# Patient Record
Sex: MALE | Race: BLACK OR AFRICAN AMERICAN | NOT HISPANIC OR LATINO | Employment: OTHER | URBAN - METROPOLITAN AREA
[De-identification: names, ages, dates, MRNs, and addresses within clinical notes are randomized per-mention and may not be internally consistent; named-entity substitution may affect disease eponyms.]

---

## 2017-04-20 ENCOUNTER — HOSPITAL ENCOUNTER (EMERGENCY)
Facility: OTHER | Age: 27
Discharge: HOME | End: 2017-04-20
Attending: EMERGENCY MEDICINE

## 2017-04-20 VITALS
TEMPERATURE: 99 F | OXYGEN SATURATION: 100 % | SYSTOLIC BLOOD PRESSURE: 131 MMHG | HEART RATE: 60 BPM | DIASTOLIC BLOOD PRESSURE: 73 MMHG | WEIGHT: 150 LBS | RESPIRATION RATE: 16 BRPM | BODY MASS INDEX: 23.54 KG/M2 | HEIGHT: 67 IN

## 2017-04-20 DIAGNOSIS — M25.531 RIGHT WRIST PAIN: ICD-10-CM

## 2017-04-20 DIAGNOSIS — M25.539 WRIST PAIN: ICD-10-CM

## 2017-04-20 DIAGNOSIS — M25.331 WRIST JOINT INSTABILITY, RIGHT: Primary | ICD-10-CM

## 2017-04-20 PROCEDURE — 29125 APPL SHORT ARM SPLINT STATIC: CPT | Mod: RT

## 2017-04-20 PROCEDURE — 99283 EMERGENCY DEPT VISIT LOW MDM: CPT | Mod: 25

## 2017-04-20 RX ORDER — IBUPROFEN 800 MG/1
800 TABLET ORAL EVERY 6 HOURS PRN
Qty: 20 TABLET | Refills: 0 | Status: SHIPPED | OUTPATIENT
Start: 2017-04-20 | End: 2018-01-18

## 2017-04-20 RX ORDER — HYDROCODONE BITARTRATE AND ACETAMINOPHEN 5; 325 MG/1; MG/1
1 TABLET ORAL EVERY 4 HOURS PRN
Qty: 10 TABLET | Refills: 0 | Status: SHIPPED | OUTPATIENT
Start: 2017-04-20 | End: 2017-04-30

## 2017-04-20 NOTE — ED PROVIDER NOTES
"Encounter Date: 4/20/2017    SCRIBE #1 NOTE: I, Shad Marieeinz, am scribing for, and in the presence of,  Dr. Smith. I have scribed the entire note.       History     Chief Complaint   Patient presents with    Wrist Pain     + rigth wrist pain, "i broke this wrsit a few years ago and had a cast. Now it just keeps locking up on my and goes numb sometimes". Pt denies trauma to site. +2 pulses to affected extremity.     Review of patient's allergies indicates:  No Known Allergies  HPI Comments: Time seen by provider: 12:32 PM    This is a 26 y.o. male who presents with complaint of right wrist problem. He reports chronic pain to the wrist since he broke it a few years ago. Over the past couple of days though, he has noticed that the wrist "locks up." He notes accompanying numbness in his hand and fingers. He rates the pain an 8/10 in severity. He has taken ibuprofen without relief. He is right hand dominant. He denies weakness, fever, chills and other joint pain. He smokes 0.25 packs of cigarettes per day but denies drinking and drug use.    The history is provided by the patient and a significant other.     History reviewed. No pertinent past medical history.  Past Surgical History:   Procedure Laterality Date    NO PAST SURGERIES       History reviewed. No pertinent family history.  Social History   Substance Use Topics    Smoking status: Current Every Day Smoker     Packs/day: 0.02     Types: Cigarettes    Smokeless tobacco: None    Alcohol use No     Review of Systems   Constitutional: Negative for chills, diaphoresis and fever.   HENT: Negative for congestion and sore throat.    Eyes: Negative for redness.   Respiratory: Negative for cough, chest tightness, shortness of breath and wheezing.    Cardiovascular: Negative for chest pain, palpitations and leg swelling.   Gastrointestinal: Negative for abdominal pain, diarrhea, nausea and vomiting.   Genitourinary: Negative for difficulty urinating and dysuria. "   Musculoskeletal: Negative for back pain and neck pain.        Right wrist pain   Skin: Negative for color change and rash.   Neurological: Positive for numbness. Negative for headaches.   Psychiatric/Behavioral: Negative for behavioral problems and confusion.       Physical Exam   Initial Vitals   BP Pulse Resp Temp SpO2   04/20/17 1114 04/20/17 1114 04/20/17 1114 04/20/17 1114 04/20/17 1114   143/90 90 16 97.8 °F (36.6 °C) 98 %     Physical Exam    Nursing note and vitals reviewed.  Constitutional: He appears well-developed and well-nourished. He is not diaphoretic. No distress.   HENT:   Head: Normocephalic and atraumatic.   Eyes: Right eye exhibits no discharge. Left eye exhibits no discharge.   Neck: Normal range of motion. Neck supple.   Cardiovascular: Normal rate, regular rhythm, normal heart sounds and intact distal pulses. Exam reveals no gallop and no friction rub.    No murmur heard.  2+ radial pulse of RUE.    Pulmonary/Chest: Breath sounds normal. No respiratory distress. He has no wheezes. He has no rhonchi. He has no rales.   Musculoskeletal: He exhibits tenderness.   Right wrist TTP over the distal ulna. Inability to supinate or pronate. Fingers held in slight passive flexion.    Neurological: He is alert and oriented to person, place, and time. No sensory deficit.   General weakness of right hand. Distal sensation is intact.    Skin: Skin is warm. No rash and no abscess noted. No erythema. No pallor.   RUE w/o erythema or skin changes.    Psychiatric: He has a normal mood and affect. His behavior is normal. Judgment and thought content normal.         ED Course   Splint Application  Date/Time: 4/20/2017 1:39 PM  Performed by: LANCE CARVAJAL  Authorized by: LANCE CARVAJAL   Location details: right wrist  Splint type: sugar tong  Post-procedure: The splinted body part was neurovascularly unchanged following the procedure.  Patient tolerance: Patient tolerated the procedure well with no immediate  complications        Labs Reviewed - No data to display       X-Rays:   Independently Interpreted Readings:   Other Readings:  X-Ray Wrist Complete Right - Abnormal superior projection of ulna seen on lateral view. No obvious fracture present.     Imaging Results         X-Ray Wrist Complete Right (Final result) Result time:  04/20/17 11:49:51    Final result by Berlin Barr MD (04/20/17 11:49:51)    Impression:     Findings concerning for radial ulnar dislocation.  Clinical correlation is recommended for this finding.  A punctate possible avulsion fracture is noted in the ulnar carpal joint space.      Electronically signed by: BERLIN BARR MD  Date:     04/20/17  Time:    11:49     Narrative:    Comparison: 3/8/2012.    Findings: PA, lateral and oblique radiographs of the right wrist were obtained.  The PA radiograph this appeared to show dislocation of the radial ulnar joint with distal displacement of the ulna.  There is soft tissue swelling present and there is a tiny, punctate radiopaque focus at the ulnar collateral pleural joint which may represent an avulsion fracture.  The remaining osseous structures, soft tissues and joint spaces are normal.             Medical Decision Making:   Clinical Tests:   Radiological Study: Ordered and Reviewed  ED Management:  Emergent evaluation a 26-year-old male with complaint of wrist pain, nontraumatic.  He does have a history of previous wrist fracture and chronic pain.  On exam, there is general weakness of the hand and tenderness over the distal ulna.  X-ray shows some dislocation, which after discussion with orthopedics may be chronic.  Orthopedic recommend sugar tong splint and follow-up with orthopedics.  He was discharged in good condition with ibuprofen, encouraged close follow-up or return for worsening.  Other:   I have discussed this case with another health care provider.       <> Summary of the Discussion: 12:42 PM - I paged orthopedics.  1:38 PM -  I discussed the case with Orthopedics who states the X-Ray findings are most likely chronic based on Hx of previous injury. He suggested a sugar tong splint and follow up with hand specialist.     Additional MDM:   X-Rays: I have independently interpreted X-Ray(s) - see notes.          Scribe Attestation:   Scribe #1: I performed the above scribed service and the documentation accurately describes the services I performed. I attest to the accuracy of the note.    Attending Attestation:           Physician Attestation for Scribe:  Physician Attestation Statement for Scribe #1: I, Dr. Smith, reviewed documentation, as scribed by Shad Rodriguez in my presence, and it is both accurate and complete.                 ED Course     Clinical Impression:     1. Wrist joint instability, right    2. Wrist pain    3. Right wrist pain               Sabiha Smith MD  04/22/17 9732

## 2017-04-20 NOTE — ED AVS SNAPSHOT
[facility]  [address]  [facility] [address]               Yvonne Phipps   2017 12:01 PM   ED    Description:  Male : 1990   Department:  [facility]           Your Care was Coordinated By:     Provider Role From To    Sabiha Smith MD Attending Provider 17 1223 --    Nanda Castro PA-C Physician Assistant 17 1224 17 1224      Reason for Visit     Wrist Pain           Diagnoses this Visit        Comments    Wrist joint instability, right    -  Primary     Wrist pain         Right wrist pain           ED Disposition     None           To Do List           Follow-up Information     Schedule an appointment as soon as possible for a visit with [facility].    Why:  or the orthopedist of your choice    Contact information:    [facility]        Follow up with [facility].    Specialty:  Emergency Medicine    Why:  As needed, If symptoms worsen    Contact information:    [address] [facility] [id number] [phone]       These Medications        Disp Refills Start End    ibuprofen (ADVIL,MOTRIN) 800 MG tablet 20 tablet 0 2017     Take 1 tablet (800 mg total) by mouth every 6 (six) hours as needed for Pain. - Oral      [facility]     [facility] Nurse Care Line - 24/7 Assistance  Unless otherwise directed by your provider, please contact [facility], our nurse care line that is available for 24/7 assistance.     Registered nurses in the [facility] provide: appointment scheduling, clinical advisement, health education, and other advisory services.  Call: [phone] (toll free)               Medications           Message regarding Medications     Verify the changes and/or additions to your medication regime listed below are the same as discussed with your clinician today.  If any of these changes or additions are incorrect,  "please notify your healthcare provider.        START taking these NEW medications        Refills    ibuprofen (ADVIL,MOTRIN) 800 MG tablet 0    Sig: Take 1 tablet (800 mg total) by mouth every 6 (six) hours as needed for Pain.    Class: Print    Route: Oral      STOP taking these medications     diclofenac (VOLTAREN) 25 MG TbEC Take 1 tablet (25 mg total) by mouth 3 (three) times daily.           Verify that the below list of medications is an accurate representation of the medications you are currently taking.  If none reported, the list may be blank. If incorrect, please contact your healthcare provider. Carry this list with you in case of emergency.           Current Medications     ibuprofen (ADVIL,MOTRIN) 800 MG tablet Take 1 tablet (800 mg total) by mouth every 6 (six) hours as needed for Pain.           Clinical Reference Information           Your Vitals Were     BP Pulse Temp Resp Height Weight    114/68 (BP Location: Right arm, Patient Position: Sitting, BP Method: Automatic) 70 98.4 °F (36.9 °C) (Oral) 16 5' 7" (1.702 m) 68 kg (150 lb)    SpO2 BMI             99% 23.49 kg/m2         Allergies as of 4/20/2017     No Known Allergies      Immunizations Administered on Date of Encounter - 4/20/2017     None      ED Micro, Lab, POCT     None      ED Imaging Orders     Start Ordered       Status Ordering Provider    04/20/17 1116 04/20/17 1115  X-Ray Wrist Complete Right  1 time imaging      Final result       Discharge References/Attachments     ARTHRALGIA (ENGLISH)      [facility] Sign-Up     Activating your [facility] account is as easy as 1-2-3!     1) Visit [email or web address], select Sign Up Now, enter this activation code and your date of birth, then select Next.  [email or web address]  Expires: 6/4/2017 12:29 PM      2) Create a username and password to use when you visit [facility] in the future and select a security question in case you lose your password and select Next.    3) Enter your e-mail address and click " Sign Up!    Additional Information  If you have questions, please e-mail [email or web address] or call [phone] to talk to our [facility] staff. Remember, [facility] is NOT to be used for urgent needs. For medical emergencies, dial 911.         Smoking Cessation     If you would like to quit smoking:   You may be eligible for free services if you are a [place] resident and started smoking cigarettes before September 1, 1988.  Call the Smoking Cessation Trust ([facility]) toll free at [phone] or [phone] [phone].   Call 3-536-QUIT-NOW if you do not meet the above criteria.   Contact us via email: [email or web address]   View our website for more information: [email or web address]         [facility] complies with applicable Federal civil rights laws and does not discriminate on the basis of race, color, national origin, age, disability, or sex.        Language Assistance Services     ATTENTION: Language assistance services are available, free of charge. Please call [phone].      ATENCIÓN: Si habla español, tiene a elizabeth disposición servicios gratuitos de asistencia lingüística. Llame al [phone].     CHÚ Ý: N?u b?n nói Ti?ng Vi?t, có các d?ch v? h? tr? ngôn ng? mi?n phí dành cho b?n. G?i s? [phone].

## 2017-04-20 NOTE — ED NOTES
Patient Identifiers for Yvonne Phipps checked and correct  LOC: The patient is awake, alert and aware of environment with an appropriate affect, the patient is oriented x 3 and speaking appropriate.  APPEARANCE: Patient resting comfortably and in no acute distress. The patient is clean and well groomed. The patient's clothing is properly fastened.  SKIN: The skin is warm and dry. The patient has normal skin turgor and moist mucus membranes.  Musculoskeletal :  Decreased right wrist range of motion noted. Mild swelling with tenderness upon palpation. Cap refill of right fingers = 2 seconds. Right hand & fingers pink & warm.  RESPIRATORY: Airway is open and patent, respirations are spontaneous, patient has a normal effort and rate.   PULSES: 2+ radiall pulses, symmetrical .    Will continue to monitor
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
## 2018-01-18 ENCOUNTER — HOSPITAL ENCOUNTER (EMERGENCY)
Facility: OTHER | Age: 28
Discharge: LEFT BEFORE DISCHARGE | End: 2018-01-18
Attending: EMERGENCY MEDICINE
Payer: MEDICAID

## 2018-01-18 VITALS
OXYGEN SATURATION: 100 % | BODY MASS INDEX: 26.52 KG/M2 | HEIGHT: 66 IN | TEMPERATURE: 99 F | RESPIRATION RATE: 16 BRPM | SYSTOLIC BLOOD PRESSURE: 144 MMHG | DIASTOLIC BLOOD PRESSURE: 83 MMHG | WEIGHT: 165 LBS | HEART RATE: 79 BPM

## 2018-01-18 DIAGNOSIS — R51.9 ACUTE NONINTRACTABLE HEADACHE, UNSPECIFIED HEADACHE TYPE: Primary | ICD-10-CM

## 2018-01-18 PROCEDURE — 99283 EMERGENCY DEPT VISIT LOW MDM: CPT | Mod: 27

## 2018-01-18 PROCEDURE — 25000003 PHARM REV CODE 250: Performed by: PHYSICIAN ASSISTANT

## 2018-01-18 RX ORDER — ACETAMINOPHEN 500 MG
1000 TABLET ORAL
Status: COMPLETED | OUTPATIENT
Start: 2018-01-18 | End: 2018-01-18

## 2018-01-18 RX ORDER — VALPROIC ACID 250 MG/1
250 CAPSULE, LIQUID FILLED ORAL 4 TIMES DAILY
COMMUNITY

## 2018-01-18 RX ADMIN — ACETAMINOPHEN 1000 MG: 500 TABLET ORAL at 03:01
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
## 2018-01-18 NOTE — ED NOTES
X-ray notified staff that the patient was not in [facility] when they went to get him for a scan. After looking around the ED and the waiting room the patient could not be found.

## 2018-01-18 NOTE — ED PROVIDER NOTES
Encounter Date: 1/18/2018       History     Chief Complaint   Patient presents with    Headache     generalized headache, seen in this ED this morning and discharged, denies visiosn changes, NV, dizziness     Patient is a 27 y.o. male presenting with complaints of a persistent headache.  He admits he was seen in this emergency department earlier today and given Toradol and ibuprofen.  He states the medication has not helped.  He reports his headache has persisted but he has not tried any other medication.  He states it is a generalized headache ×3 days.  He denies neck pain or stiffness, fever, chills, blurred vision, nausea or vomiting.  He states he is concerned because he is admitted to [facility] with a headache a few months ago.  He states he would like a CT scan. He denies recent drug use besides marijuana two days ago.      The history is provided by the patient.     Review of patient's allergies indicates:  No Known Allergies  Past Medical History:   Diagnosis Date    Bipolar 1 disorder      Past Surgical History:   Procedure Laterality Date    NO PAST SURGERIES       History reviewed. No pertinent family history.  Social History   Substance Use Topics    Smoking status: Current Every Day Smoker     Packs/day: 0.02     Types: Cigarettes    Smokeless tobacco: Never Used    Alcohol use No     Review of Systems   Constitutional: Negative for activity change, chills, fatigue and fever.   HENT: Negative for congestion, rhinorrhea and sore throat.    Eyes: Negative for photophobia and visual disturbance.   Respiratory: Negative for cough and shortness of breath.    Cardiovascular: Negative for chest pain.   Gastrointestinal: Negative for abdominal pain, diarrhea, nausea and vomiting.   Genitourinary: Negative for dysuria, hematuria and urgency.   Musculoskeletal: Negative for back pain, myalgias and neck pain.   Skin: Negative for color change and wound.   Neurological: Positive for headaches.  Negative for weakness.   Psychiatric/Behavioral: Negative for agitation and confusion.       Physical Exam     Initial Vitals [01/18/18 1500]   BP Pulse Resp Temp SpO2   (!) 144/83 79 16 98.5 °F (36.9 °C) 100 %      MAP       103.33         Physical Exam    Nursing note and vitals reviewed.  Constitutional: Vital signs are normal. He appears well-developed and well-nourished. He is not diaphoretic.  Non-toxic appearance. He does not have a sickly appearance. He does not appear ill. No distress.   Well appearing, -American male unaccompanied in the emergency department.  Smiling and in no acute distress.  Makes good eye contact.   HENT:   Head: Normocephalic and atraumatic.   Right Ear: Hearing, tympanic membrane, external ear and ear canal normal.   Left Ear: Hearing, tympanic membrane, external ear and ear canal normal.   Nose: Nose normal.   Mouth/Throat: Oropharynx is clear and moist.   Eyes: Conjunctivae, EOM and lids are normal. Pupils are equal, round, and reactive to light.   Neck: Normal range of motion and full passive range of motion without pain. Neck supple. No spinous process tenderness present.   Cardiovascular: Normal rate, regular rhythm and normal heart sounds.   Pulmonary/Chest: Breath sounds normal. No respiratory distress. He has no wheezes.   Musculoskeletal: Normal range of motion.   Neurological: He is alert and oriented to person, place, and time. He has normal strength. No cranial nerve deficit or sensory deficit. GCS eye subscore is 4. GCS verbal subscore is 5. GCS motor subscore is 6.   Skin: Skin is warm.   Psychiatric: He has a normal mood and affect. His behavior is normal. Judgment and thought content normal.         ED Course   Procedures  Labs Reviewed - No data to display          Medical Decision Making:   History:   Old Medical Records: I decided to obtain old medical records.  Old Records Summarized: other records.       <> Summary of Records: Reviewed medical record in  Epic with [facility] records.  Patient was admitted for altered mental status with headache, negative LP and CT scan.  Believed to be likely delirium associated with drug use.  Patient was seen and evaluated, ultimately discharged by psychiatry.  Initial Assessment:   Urgent evaluation of a 27 y.o. male presenting to the emergency department complaining of headache. Patient is afebrile, nontoxic appearing and hemodynamically stable. Physical exam reveals no focal neurological deficits or weakness. There are no focal weakness, numbness, meningismus, or other focal neurologic deficit. There is no history of trauma, fevers, elevated blood pressure to suggest meningitis, subarachnoid hemorrhage, TIA, stroke, mass, or hypertensive urgency. Given his concern and history, will obtain CT head.     Clinical Tests:   Radiological Study: Ordered  ED Management:  Prior to the completion of the head CT, the patient eloped from the emergency department without informing anyone.  The patient's history, physical exam, and plan of care was discussed with and agreed upon with my supervising physician.    Other:   I have discussed this case with another health care provider.       <> Summary of the Discussion: Dr. Riggins  This note was created using Dragon Medical Dictation. There may be typographical errors secondary to dictation.                    ED Course      Clinical Impression:     1. Acute nonintractable headache, unspecified headache type         Disposition:   Disposition: Eloped  Condition: Stable                        Mckenzie Yun PA-C  01/18/18 6116

## 2018-01-18 NOTE — ED NOTES
Appearance: Pt awake, alert & oriented to person, place & time. Pt in no acute distress at present time. Pt is clean and well groomed with clothes appropriately fastened. SEE HPI.   Skin: Skin warm, dry & intact. Color consistent with ethnicity. Mucous membranes moist. No breakdown or brusing noted.   Musculoskeletal: Patient moving all extremities well, no obvious swelling or deformities noted.   Respiratory: Respirations spontaneous, even, and non-labored. Visible chest rise noted. Airway is open and patent. No accessory muscle use noted.   Neurologic: Sensation is intact. Speech is clear and appropriate. Eyes open spontaneously, behavior appropriate to situation, follows commands, facial expression symmetrical, bilateral hand grasp equal and even, purposeful motor response noted.  Cardiac: No Bilateral lower extremity edema. Cap refill is <3 seconds. Pt denies active chest pains, SOB, dizziness, blurred vision, weakness or fatigue at this time.   Abdomen: Abdomen soft, non-tender to palpation. Pt denies active abd pains, cramping or discomfort, No N/V/D at this time.   : Pt reports no dysuria or hematuria.

## 2018-05-14 ENCOUNTER — HOSPITAL ENCOUNTER (EMERGENCY)
Facility: OTHER | Age: 28
Discharge: HOME | End: 2018-05-14
Attending: EMERGENCY MEDICINE
Payer: MEDICAID

## 2018-05-14 VITALS
RESPIRATION RATE: 16 BRPM | DIASTOLIC BLOOD PRESSURE: 80 MMHG | HEART RATE: 113 BPM | BODY MASS INDEX: 25.71 KG/M2 | SYSTOLIC BLOOD PRESSURE: 136 MMHG | HEIGHT: 66 IN | OXYGEN SATURATION: 100 % | TEMPERATURE: 99 F | WEIGHT: 160 LBS

## 2018-05-14 DIAGNOSIS — M25.519 SHOULDER PAIN: ICD-10-CM

## 2018-05-14 DIAGNOSIS — M25.511 ACUTE PAIN OF RIGHT SHOULDER: Primary | ICD-10-CM

## 2018-05-14 PROCEDURE — 25000003 PHARM REV CODE 250: Performed by: PHYSICIAN ASSISTANT

## 2018-05-14 PROCEDURE — 99283 EMERGENCY DEPT VISIT LOW MDM: CPT | Mod: 25

## 2018-05-14 RX ORDER — CYCLOBENZAPRINE HCL 10 MG
10 TABLET ORAL 3 TIMES DAILY PRN
Qty: 15 TABLET | Refills: 0 | Status: SHIPPED | OUTPATIENT
Start: 2018-05-14 | End: 2018-05-19

## 2018-05-14 RX ORDER — IBUPROFEN 600 MG/1
600 TABLET ORAL
Status: COMPLETED | OUTPATIENT
Start: 2018-05-14 | End: 2018-05-14

## 2018-05-14 RX ORDER — IBUPROFEN 600 MG/1
600 TABLET ORAL EVERY 6 HOURS PRN
Qty: 20 TABLET | Refills: 0 | Status: SHIPPED | OUTPATIENT
Start: 2018-05-14

## 2018-05-14 RX ORDER — CYCLOBENZAPRINE HCL 10 MG
10 TABLET ORAL
Status: COMPLETED | OUTPATIENT
Start: 2018-05-14 | End: 2018-05-14

## 2018-05-14 RX ADMIN — IBUPROFEN 600 MG: 600 TABLET, FILM COATED ORAL at 07:05

## 2018-05-14 RX ADMIN — CYCLOBENZAPRINE HYDROCHLORIDE 10 MG: 10 TABLET, FILM COATED ORAL at 07:05

## 2018-05-14 NOTE — ED PROVIDER NOTES
Encounter Date: 5/14/2018       History     Chief Complaint   Patient presents with    Arm Pain     right arm tingling and difficulty raising and moving arm x3 days. denies trauma/injury.     Patient is 27 year old male who presents with complaints of right arm pain that has been present for 3 days prior to arrival.  He reports pain is worse with movement and there is associated numbness and tingling radiating down his arm.  He reports numbness and tingling sensation is worse when he tries to lift his arm and is improved with rest.  He denies trauma or injury as cause of this pain. He reports it started gradually in the middle of the day and has only been persistent without worsening.  He denies associated fever, chills, nausea, vomiting, chest pain or shortness of breath.  He has not taken any medications to help with the symptoms.  He denies other concerning symptoms such as lower extremity weakness, pain, numbness, tingling.  He denies headache, dizziness, altered mental status or confusion.  He denies slurred speech.  He is currently on accompanied in the ER.          Review of patient's allergies indicates:  No Known Allergies  Past Medical History:   Diagnosis Date    Bipolar 1 disorder      Past Surgical History:   Procedure Laterality Date    NO PAST SURGERIES       No family history on file.  Social History   Substance Use Topics    Smoking status: Current Every Day Smoker     Packs/day: 0.02     Types: Cigarettes    Smokeless tobacco: Never Used    Alcohol use No     Review of Systems   Constitutional: Negative for fever.   HENT: Negative for sore throat.    Respiratory: Negative for shortness of breath.    Cardiovascular: Negative for chest pain.   Gastrointestinal: Negative for nausea.   Genitourinary: Negative for dysuria.   Musculoskeletal: Negative for back pain.        Right shoulder pain and arm pain   Skin: Negative for rash.   Neurological: Negative for weakness.   Hematological: Does not  bruise/bleed easily.       Physical Exam     Initial Vitals [05/14/18 1732]   BP Pulse Resp Temp SpO2   136/80 (!) 113 16 98.6 °F (37 °C) 100 %      MAP       98.67         Physical Exam    Nursing note and vitals reviewed.  Constitutional: He appears well-developed and well-nourished. He is not diaphoretic. No distress.   Healthy appearing male in no acute distress or apparent pain.  He makes good eye contact, speaks in clear full sentences and ambulates with ease.   HENT:   Head: Normocephalic.   Eyes: Conjunctivae and EOM are normal. Pupils are equal, round, and reactive to light. Right eye exhibits no discharge. Left eye exhibits no discharge. No scleral icterus.   Neck: Normal range of motion.   Cardiovascular: Normal rate, regular rhythm and normal heart sounds. Exam reveals no gallop and no friction rub.    No murmur heard.  Heart rate 99 beats per minute at rest   Pulmonary/Chest: Breath sounds normal. He has no wheezes. He has no rhonchi. He has no rales.   Abdominal: Soft. Bowel sounds are normal. There is no tenderness. There is no rebound and no guarding.   Musculoskeletal: Normal range of motion. He exhibits no edema or tenderness.   Right upper extremity has tenderness to palpation over the deltoid region as well as AC joint.  There is passive full range of motion with reproducible pain on internal and external rotation as well as extension and flexion.  This there is pain with active range of motion. He reports pain with squeezing his right hand so consequently has poor examination of  strength.  He has normal sensation to light touch.    There is no C, T, L midline bony tenderness crepitus or step-offs    Lower extremity has normal exam   Lymphadenopathy:     He has no cervical adenopathy.   Neurological: He is alert and oriented to person, place, and time. He has normal strength.   Skin: Skin is warm. Capillary refill takes less than 2 seconds. No rash and no abscess noted. No erythema.    Psychiatric: He has a normal mood and affect. His behavior is normal. Thought content normal.         ED Course   Procedures  Labs Reviewed - No data to display     Imaging Results          X-Ray Shoulder Trauma Right (Final result)  Result time 05/14/18 19:19:07    Final result by Edvin Andrea MD (05/14/18 19:19:07)                 Impression:      No acute fracture.      Electronically signed by: Edvin Andrea MD  Date:    05/14/2018  Time:    19:19             Narrative:    EXAMINATION:  XR SHOULDER TRAUMA 3 VIEW RIGHT    CLINICAL HISTORY:  Pain in unspecified shoulder    TECHNIQUE:  Three views of the right shoulder were performed.    COMPARISON:  None    FINDINGS:  Bones: No fracture.  No lytic or blastic lesion.    Joints: No evidence for glenohumeral dislocation.  Acromioclavicular joint is unremarkable.    Soft tissues: Unremarkable.                                   Medical Decision Making:   ED Management:  Urgent evaluation a 27-year-old male who presents with complaints of right upper extremity pain with associated numbness and tingling concerning for possible cervical radiculopathy.  He is afebrile, nontoxic appearing, hemodynamically stable. I appreciate tachycardia upon triage note however resting heart rate on my exam is 99 beats per minute.  Physical exam reveals soft tissue tenderness to palpation with no bony landmark abnormalities or obvious deformity.  Normal distal radial pulse with normal sensation to light touch.  I do not suspect CVA, TIA, acute intracranial pathology.  Shoulder x-ray is pending.  Will give muscle relaxer and anti-inflammatory here in the emergency department and reassessed.    Update: x-ray is unremarkable for acute pathology. I suspect symptoms are related to muscle strain with associated nerve compression. Do not suspect acute cervical spine pathology though symptoms could be related to radiculopathy. Will discharge with NSAIDs and muscle relaxer and encourage  follow-up with PCP in 1-2 days for symptom re-check. He is amenable to plan. Case discussed with attending MD who agrees with plan.                       Clinical Impression:   The primary encounter diagnosis was Acute pain of right shoulder. A diagnosis of Shoulder pain was also pertinent to this visit.                           Nanda Castro PA-C  05/14/18 2052

## 2018-05-15 NOTE — ED NOTES
Pt complains of pain to rt arm X 4 days. Pt states that he cannot move his arm without assistance. There is no swelling or deformities noted. Pt denies any trauma  LOC: Pt is awake alert and aware of environment, oriented X3 and speaking appropriately  Appearance: Pt is in no acute distress, Pt is well groomed and clean  Skin: skin is warm and dry with normal turgor, mucus membranes are moist and pink, skin is intact with no bruising or breakdown  Muskuloskeletal: Pt moves all extremities well except for rt arm. Pt needs assistance lifting arm, there is no obvious swelling or deformities noted, pulses are intact.  Respiratory: Airway is open and patent, respirations are spontaneous and even.  Cardiac: tachycardic, no edema and cap refill is <3sec  Neuro: Pt follows commands easily and has no obvious deficits

## 2019-10-02 ENCOUNTER — HOSPITAL ENCOUNTER (EMERGENCY)
Facility: OTHER | Age: 29
Discharge: HOME | End: 2019-10-02
Attending: EMERGENCY MEDICINE
Payer: MEDICAID

## 2019-10-02 VITALS
WEIGHT: 172 LBS | SYSTOLIC BLOOD PRESSURE: 121 MMHG | HEIGHT: 67 IN | DIASTOLIC BLOOD PRESSURE: 70 MMHG | TEMPERATURE: 98 F | BODY MASS INDEX: 27 KG/M2 | HEART RATE: 69 BPM | RESPIRATION RATE: 18 BRPM | OXYGEN SATURATION: 97 %

## 2019-10-02 DIAGNOSIS — T50.904A DRUG OVERDOSE, UNDETERMINED INTENT, INITIAL ENCOUNTER: Primary | ICD-10-CM

## 2019-10-02 PROCEDURE — 99285 EMERGENCY DEPT VISIT HI MDM: CPT

## 2019-10-02 NOTE — ED NOTES
"Pt to ED via EMS with c/o drug overdose. Pt admits to smoking 2 joints with marijuana, states "It must have been laced with something". Pt given 0.5mg Narcan IVP by EMS, pt responded well. Pt currently AAOx4, speaking in clear and complete sentences, RR even and unlabored. Pt connected to continuous monitoring. Will continue to monitor.     Two patient identifiers have been checked and are correct.      Appearance: Pt awake, alert & oriented to person, place & time. Pt in no acute distress at present time. Pt is clean and well groomed with clothes appropriately fastened.   Skin: Skin warm, dry & intact. Color consistent with ethnicity. Mucous membranes moist. No breakdown or brusing noted.   Musculoskeletal: Patient moving all extremities well, no obvious swelling or deformities noted.   Respiratory: Respirations spontaneous, even, and non-labored. Visible chest rise noted. Airway is open and patent. No accessory muscle use noted.   Neurologic: Sensation is intact. Speech is clear and appropriate. Eyes open spontaneously, behavior appropriate to situation, follows commands, facial expression symmetrical, bilateral hand grasp equal and even, purposeful motor response noted.  Cardiac: All peripheral pulses present. No Bilateral lower extremity edema. Cap refill is <3 seconds.  Abdomen: Abdomen soft, non-tender to palpation.   : Pt reports no dysuria or hematuria.           "

## 2019-10-02 NOTE — ED NOTES
Bed: Incoming ED Transfer 1  Expected date:   Expected time:   Means of arrival:   Comments:  Ems over dose.

## 2019-10-02 NOTE — ED PROVIDER NOTES
"Encounter Date: 10/2/2019    SCRIBE #1 NOTE: I, Mehnaz Patel, am scribing for, and in the presence of, Dr. Robertson.       History     Chief Complaint   Patient presents with    Drug Overdose     pt with c/o found down given narcan and came around . pt unsure of drugs taken per ems.      Time seen by provider: 1:36 PM    This is a 29 y.o. male who presents per EMS due to "smoking bad weed." Pt states that he bought new weed today called "[employer]" and thought it smelled abnormal. He states that after he smoked the marijuana, his mother saw him acting strangely and she activated EMS. He states that she thought he "was too high" as he was asking her to scratch his head while sitting on the floor. He denies any AH/VH. He has no other complaints.    The history is provided by the patient.     Review of patient's allergies indicates:  No Known Allergies  Past Medical History:   Diagnosis Date    Bipolar 1 disorder      Past Surgical History:   Procedure Laterality Date    NO PAST SURGERIES       No family history on file.  Social History     Tobacco Use    Smoking status: Current Every Day Smoker     Packs/day: 0.02     Types: Cigarettes    Smokeless tobacco: Never Used   Substance Use Topics    Alcohol use: No    Drug use: Yes     Types: Marijuana     Review of Systems   Constitutional: Negative for chills and fever.   HENT: Negative for congestion and sore throat.    Respiratory: Negative for cough, chest tightness and shortness of breath.    Cardiovascular: Negative for chest pain.   Gastrointestinal: Negative for abdominal pain, diarrhea, nausea and vomiting.   Endocrine: Negative for polyuria.   Allergic/Immunologic: Negative for immunocompromised state.   Neurological: Negative for dizziness and weakness.   Hematological: Does not bruise/bleed easily.   Psychiatric/Behavioral: Negative for agitation, confusion, hallucinations, self-injury and suicidal ideas.   All other systems reviewed and are " negative.      Physical Exam     Initial Vitals [10/02/19 1352]   BP Pulse Resp Temp SpO2   (!) 142/94 94 18 98.1 °F (36.7 °C) 99 %      MAP       --         Physical Exam    Nursing note and vitals reviewed.  Constitutional: He appears well-developed and well-nourished. He is not diaphoretic. No distress.   HENT:   Head: Normocephalic and atraumatic.   Right Ear: External ear normal.   Left Ear: External ear normal.   Nose: Nose normal.   Eyes: Conjunctivae and EOM are normal. Pupils are equal, round, and reactive to light.   Neck: Normal range of motion. Neck supple. No tracheal deviation present. No JVD present.   Cardiovascular: Normal rate, regular rhythm, normal heart sounds and intact distal pulses. Exam reveals no gallop and no friction rub.    No murmur heard.  Pulmonary/Chest: Breath sounds normal. No respiratory distress. He has no wheezes. He has no rhonchi. He has no rales. He exhibits no tenderness.   Abdominal: Soft. Bowel sounds are normal. He exhibits no distension and no mass. There is no tenderness. There is no rebound and no guarding.   Musculoskeletal: Normal range of motion. He exhibits no edema or tenderness.   Neurological: He is alert and oriented to person, place, and time. He has normal strength. He displays normal reflexes. No cranial nerve deficit or sensory deficit.   Skin: Skin is warm and dry. Capillary refill takes less than 2 seconds. No rash noted. No erythema.   Psychiatric: He has a normal mood and affect. Thought content normal.         ED Course   Procedures  Labs Reviewed - No data to display       Imaging Results    None          Medical Decision Making:   History:   Old Medical Records: I decided to obtain old medical records.  Differential Diagnosis:   Drug overdose, acute psychosis, bipolar disorder, schizophrenic onset            Scribe Attestation:   Scribe #1: I performed the above scribed service and the documentation accurately describes the services I performed. I  attest to the accuracy of the note.    Attending Attestation:           Physician Attestation for Scribe:  Physician Attestation Statement for Scribe #1: I, Dr. Robertson, reviewed documentation, as scribed by Mehnaz Patel in my presence, and it is both accurate and complete.                 ED Course as of Oct 03 1435   Wed Oct 02, 2019   1520 Patient is awake, alert, oriented x3, GCS is 15, neuro is intact. Patient is laughing inappropriately conversant.  Has arranged for his aunt to come pick him up.   [MA]      ED Course User Index  [MA] Javon Robertson MD     Clinical Impression:     1. Drug overdose, undetermined intent, initial encounter                                   Javon Robertson MD  10/03/19 5544